# Patient Record
Sex: FEMALE | Race: WHITE
[De-identification: names, ages, dates, MRNs, and addresses within clinical notes are randomized per-mention and may not be internally consistent; named-entity substitution may affect disease eponyms.]

---

## 2019-12-18 ENCOUNTER — HOSPITAL ENCOUNTER (OUTPATIENT)
Dept: HOSPITAL 47 - RADXRMAIN | Age: 39
Discharge: HOME | End: 2019-12-18
Payer: COMMERCIAL

## 2019-12-18 DIAGNOSIS — S00.83XD: Primary | ICD-10-CM

## 2019-12-18 PROCEDURE — 70450 CT HEAD/BRAIN W/O DYE: CPT

## 2019-12-18 NOTE — CT
EXAMINATION TYPE: CT brain wo con

 

DATE OF EXAM: 12/18/2019

 

COMPARISON: None.

 

HISTORY: headache/lightheaded post head trauma

 

CT DLP: 796 mGycm.  Automated Exposure Control for Dose Reduction was Utilized.

 

 

TECHNIQUE: CT scan of the head is performed without contrast.

 

FINDINGS:   There is no acute intracranial hemorrhage, mass effect, or midline shift identified.  The
 ventricles and sulci are within normal limits in size. Gray-white matter differentiation is maintain
ed. The globes are intact and the visualized sinuses are clear.

 

IMPRESSION:  No acute intracranial hemorrhage, mass effect, or midline shift is seen.